# Patient Record
Sex: FEMALE | Race: BLACK OR AFRICAN AMERICAN | NOT HISPANIC OR LATINO | Employment: FULL TIME | URBAN - METROPOLITAN AREA
[De-identification: names, ages, dates, MRNs, and addresses within clinical notes are randomized per-mention and may not be internally consistent; named-entity substitution may affect disease eponyms.]

---

## 2024-07-08 ENCOUNTER — APPOINTMENT (OUTPATIENT)
Dept: PRIMARY CARE | Facility: CLINIC | Age: 29
End: 2024-07-08
Payer: COMMERCIAL

## 2024-07-08 VITALS
TEMPERATURE: 98.4 F | WEIGHT: 245 LBS | DIASTOLIC BLOOD PRESSURE: 72 MMHG | SYSTOLIC BLOOD PRESSURE: 120 MMHG | HEIGHT: 64 IN | HEART RATE: 76 BPM | RESPIRATION RATE: 20 BRPM | BODY MASS INDEX: 41.83 KG/M2

## 2024-07-08 DIAGNOSIS — J30.1 NON-SEASONAL ALLERGIC RHINITIS DUE TO POLLEN: ICD-10-CM

## 2024-07-08 DIAGNOSIS — J45.40 MODERATE PERSISTENT ASTHMA WITHOUT COMPLICATION (HHS-HCC): ICD-10-CM

## 2024-07-08 DIAGNOSIS — N89.8 VAGINAL ODOR: ICD-10-CM

## 2024-07-08 DIAGNOSIS — N89.8 VAGINAL DISCHARGE: Primary | ICD-10-CM

## 2024-07-08 DIAGNOSIS — E66.01 CLASS 3 SEVERE OBESITY DUE TO EXCESS CALORIES WITHOUT SERIOUS COMORBIDITY WITH BODY MASS INDEX (BMI) OF 40.0 TO 44.9 IN ADULT (MULTI): ICD-10-CM

## 2024-07-08 PROBLEM — J02.9 ACUTE PHARYNGITIS: Status: RESOLVED | Noted: 2024-02-13 | Resolved: 2024-07-08

## 2024-07-08 PROBLEM — T83.32XA IUD THREADS LOST: Status: RESOLVED | Noted: 2024-07-08 | Resolved: 2024-07-08

## 2024-07-08 PROBLEM — L20.84 INTRINSIC ECZEMA: Status: ACTIVE | Noted: 2024-07-08

## 2024-07-08 PROBLEM — M25.561 BILATERAL KNEE PAIN: Status: RESOLVED | Noted: 2024-07-08 | Resolved: 2024-07-08

## 2024-07-08 PROBLEM — H10.32 ACUTE CONJUNCTIVITIS OF LEFT EYE: Status: RESOLVED | Noted: 2024-02-13 | Resolved: 2024-07-08

## 2024-07-08 PROBLEM — O21.9 NAUSEA AND VOMITING DURING PREGNANCY (HHS-HCC): Status: RESOLVED | Noted: 2024-07-08 | Resolved: 2024-07-08

## 2024-07-08 PROBLEM — T14.8XXA LIGAMENT TEAR: Status: RESOLVED | Noted: 2024-07-08 | Resolved: 2024-07-08

## 2024-07-08 PROBLEM — M25.562 BILATERAL KNEE PAIN: Status: RESOLVED | Noted: 2024-07-08 | Resolved: 2024-07-08

## 2024-07-08 PROBLEM — E66.813 CLASS 3 SEVERE OBESITY DUE TO EXCESS CALORIES WITHOUT SERIOUS COMORBIDITY WITH BODY MASS INDEX (BMI) OF 40.0 TO 44.9 IN ADULT: Status: ACTIVE | Noted: 2024-07-08

## 2024-07-08 PROCEDURE — 99204 OFFICE O/P NEW MOD 45 MIN: CPT | Performed by: NURSE PRACTITIONER

## 2024-07-08 PROCEDURE — 87205 SMEAR GRAM STAIN: CPT

## 2024-07-08 RX ORDER — CETIRIZINE HYDROCHLORIDE 10 MG/1
10 TABLET ORAL DAILY
Qty: 30 TABLET | Refills: 5 | Status: SHIPPED | OUTPATIENT
Start: 2024-07-08 | End: 2025-01-04

## 2024-07-08 RX ORDER — ALBUTEROL SULFATE 90 UG/1
AEROSOL, METERED RESPIRATORY (INHALATION)
COMMUNITY
Start: 2017-05-12

## 2024-07-08 RX ORDER — TRIAMCINOLONE ACETONIDE 55 UG/1
2 SPRAY, METERED NASAL DAILY
Qty: 16.5 G | Refills: 11 | Status: SHIPPED | OUTPATIENT
Start: 2024-07-08 | End: 2025-07-08

## 2024-07-08 NOTE — ASSESSMENT & PLAN NOTE
States she has cut out sugary drinks and now drinking water. She is working to improve her diet. She eats oatmeal and fruit in the morning. She has stopped eating bread and switched to tortilla wraps. She is working on portion control. She did start going to [employer] and is using weight lifting machines. She can't do vigorous cardio exercise d/t asthma. Advised 150 minutes of moderate intensity (brisk walk) per week and 2-3 days per week of strength training.

## 2024-07-08 NOTE — PROGRESS NOTES
"Subjective   Delmis Dotson is a 28 y.o. female who presents for Obesity, Rash (eczema), and Vaginitis/Bacterial Vaginosis (Has odor and discharge for 4 days. She keeps getting recurring infections. ).  HPI  Review of Systems  Objective   Physical Exam  Vitals reviewed.   Constitutional:       Appearance: Normal appearance.   HENT:      Head: Normocephalic.   Eyes:      Conjunctiva/sclera: Conjunctivae normal.   Cardiovascular:      Rate and Rhythm: Normal rate and regular rhythm.      Pulses: Normal pulses.   Pulmonary:      Breath sounds: Normal breath sounds.   Abdominal:      General: Bowel sounds are normal.      Palpations: Abdomen is soft.   Musculoskeletal:      Cervical back: Neck supple.   Skin:     General: Skin is warm and dry.      Findings: Rash (multiple areas of eczema including above upper lip, across abdomen, inner thighs, and knees.) present.   Neurological:      General: No focal deficit present.      Mental Status: She is alert and oriented to person, place, and time.   Psychiatric:         Mood and Affect: Mood normal.         Thought Content: Thought content normal.     /72   Pulse 76   Temp 36.9 °C (98.4 °F)   Resp 20   Ht 1.626 m (5' 4\")   Wt 111 kg (245 lb)   BMI 42.05 kg/m²   Assessment/Plan   Problem List Items Addressed This Visit       Class 3 severe obesity due to excess calories without serious comorbidity with body mass index (BMI) of 40.0 to 44.9 in adult (Multi)     States she has cut out sugary drinks and now drinking water. She is working to improve her diet. She eats oatmeal and fruit in the morning. She has stopped eating bread and switched to tortilla wraps. She is working on portion control. She did start going to [employer] and is using weight lifting machines. She can't do vigorous cardio exercise d/t asthma. Advised 150 minutes of moderate intensity (brisk walk) per week and 2-3 days per week of strength training.          Moderate persistent asthma " without complication (HHS-HCC)    Relevant Medications    mometasone-formoterol (Dulera 100) 100-5 mcg/actuation inhaler    Non-seasonal allergic rhinitis due to pollen    Relevant Medications    cetirizine (ZyrTEC) 10 mg tablet    triamcinolone (Nasacort) 55 mcg nasal inhaler     Other Visit Diagnoses       Vaginal discharge    -  Primary    Relevant Orders    Vaginitis Gram Stain For Bacterial Vaginosis + Yeast    Vaginal odor        Relevant Orders    Vaginitis Gram Stain For Bacterial Vaginosis + Yeast          Follow-up in 2 months.

## 2024-07-09 ENCOUNTER — TELEPHONE (OUTPATIENT)
Dept: PRIMARY CARE | Facility: CLINIC | Age: 29
End: 2024-07-09
Payer: COMMERCIAL

## 2024-07-09 DIAGNOSIS — L20.84 INTRINSIC ECZEMA: Primary | ICD-10-CM

## 2024-07-09 NOTE — TELEPHONE ENCOUNTER
PT was seen yesterday- needs referral for [facility] in [place].    PT spoke to you about-"[employer] needs 30 day medical cert filled out and sent back by 3:30 today (7/9/24).    Thank you

## 2024-07-10 ENCOUNTER — TELEPHONE (OUTPATIENT)
Dept: PRIMARY CARE | Facility: CLINIC | Age: 29
End: 2024-07-10
Payer: COMMERCIAL

## 2024-07-10 DIAGNOSIS — N76.0 BACTERIAL VAGINOSIS: Primary | ICD-10-CM

## 2024-07-10 DIAGNOSIS — B96.89 BACTERIAL VAGINOSIS: Primary | ICD-10-CM

## 2024-07-10 LAB
CLUE CELLS VAG LPF-#/AREA: PRESENT /[LPF]
NUGENT SCORE: 8
YEAST VAG WET PREP-#/AREA: ABNORMAL

## 2024-07-10 RX ORDER — METRONIDAZOLE 500 MG/1
500 TABLET ORAL 2 TIMES DAILY
Qty: 14 TABLET | Refills: 0 | Status: SHIPPED | OUTPATIENT
Start: 2024-07-10 | End: 2024-07-17

## 2024-07-10 NOTE — TELEPHONE ENCOUNTER
----- Message from Nolvia Mullen sent at 7/10/2024  3:37 PM EDT -----  Please advise patient that she tested for bacterial vaginosis. I have sent a prescription for metronidazole 500 mg oral tablet to be taken twice daily for 7 days. She can not consume alcohol while on that medication because the interaction will make her ill.  Also please advise her that she does not meet the criteria for a 30-day medical certification for [employer] [facility]. Although she has asthma, that is being treated with her inhalers. She does not require oxygen or nebulizer treatments. 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

## 2024-07-10 NOTE — RESULT ENCOUNTER NOTE
Please advise patient that she tested for bacterial vaginosis. I have sent a prescription for metronidazole 500 mg oral tablet to be taken twice daily for 7 days. She can not consume alcohol while on that medication because the interaction will make her ill.  Also please advise her that she does not meet the criteria for a 30-day medical certification for [facility]. Although she has asthma, that is being treated with her inhalers. She does not require oxygen or nebulizer treatments. 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

## 2024-09-09 ENCOUNTER — APPOINTMENT (OUTPATIENT)
Dept: PRIMARY CARE | Facility: CLINIC | Age: 29
End: 2024-09-09
Payer: COMMERCIAL

## 2025-01-25 DIAGNOSIS — J30.1 NON-SEASONAL ALLERGIC RHINITIS DUE TO POLLEN: ICD-10-CM

## 2025-01-27 RX ORDER — CETIRIZINE HYDROCHLORIDE 10 MG/1
10 TABLET ORAL DAILY
Qty: 90 TABLET | Refills: 1 | Status: SHIPPED | OUTPATIENT
Start: 2025-01-27

## 2025-01-27 NOTE — TELEPHONE ENCOUNTER
Pharmacy requests prescription below    Last Office Visit: 7/8/2024   Next Office Visit: Visit date not found     Requested Prescriptions     Pending Prescriptions Disp Refills    cetirizine (ZyrTEC) 10 mg tablet [Pharmacy Med Name: CETIRIZINE HCL 10 MG TABLET] 90 tablet 1     Sig: TAKE 1 TABLET BY MOUTH EVERY DAY

## 2025-02-24 ENCOUNTER — APPOINTMENT (OUTPATIENT)
Dept: PRIMARY CARE | Facility: CLINIC | Age: 30
End: 2025-02-24
Payer: COMMERCIAL

## 2025-02-24 VITALS
DIASTOLIC BLOOD PRESSURE: 87 MMHG | BODY MASS INDEX: 43.19 KG/M2 | WEIGHT: 253 LBS | HEART RATE: 68 BPM | TEMPERATURE: 97.4 F | HEIGHT: 64 IN | SYSTOLIC BLOOD PRESSURE: 132 MMHG | RESPIRATION RATE: 17 BRPM | OXYGEN SATURATION: 96 %

## 2025-02-24 DIAGNOSIS — B96.89 BACTERIAL VAGINOSIS: ICD-10-CM

## 2025-02-24 DIAGNOSIS — Z11.3 ROUTINE SCREENING FOR STI (SEXUALLY TRANSMITTED INFECTION): ICD-10-CM

## 2025-02-24 DIAGNOSIS — E66.01 CLASS 3 SEVERE OBESITY DUE TO EXCESS CALORIES WITHOUT SERIOUS COMORBIDITY WITH BODY MASS INDEX (BMI) OF 40.0 TO 44.9 IN ADULT: Primary | ICD-10-CM

## 2025-02-24 DIAGNOSIS — J45.40 MODERATE PERSISTENT ASTHMA WITHOUT COMPLICATION (HHS-HCC): ICD-10-CM

## 2025-02-24 DIAGNOSIS — N76.0 BACTERIAL VAGINOSIS: ICD-10-CM

## 2025-02-24 DIAGNOSIS — E66.813 CLASS 3 SEVERE OBESITY DUE TO EXCESS CALORIES WITHOUT SERIOUS COMORBIDITY WITH BODY MASS INDEX (BMI) OF 40.0 TO 44.9 IN ADULT: Primary | ICD-10-CM

## 2025-02-24 PROCEDURE — 99214 OFFICE O/P EST MOD 30 MIN: CPT | Performed by: NURSE PRACTITIONER

## 2025-02-24 PROCEDURE — 1036F TOBACCO NON-USER: CPT | Performed by: NURSE PRACTITIONER

## 2025-02-24 PROCEDURE — 3008F BODY MASS INDEX DOCD: CPT | Performed by: NURSE PRACTITIONER

## 2025-02-24 RX ORDER — PHENTERMINE HYDROCHLORIDE 37.5 MG/1
37.5 TABLET ORAL
Qty: 90 TABLET | Refills: 0 | Status: SHIPPED | OUTPATIENT
Start: 2025-02-24 | End: 2025-05-25

## 2025-02-24 NOTE — PROGRESS NOTES
"Subjective   Delmis Dotson is a 29 y.o. female who presents for Weight Management, Vaginitis/Bacterial Vaginosis, and STI Screening.   Patient states she has a new partner and wants to assure she is safe. Reports clear vaginal discharge.   HPI  Review of Systems    Objective     Physical Exam    /87   Pulse 68   Temp 36.3 °C (97.4 °F)   Resp 17   Ht 1.626 m (5' 4\")   Wt 115 kg (253 lb)   SpO2 96%   BMI 43.43 kg/m²     Assessment/Plan     Problem List Items Addressed This Visit       Class 3 severe obesity due to excess calories without serious comorbidity with body mass index (BMI) of 40.0 to 44.9 in adult - Primary    Relevant Medications    phentermine (Adipex-P) 37.5 mg tablet    Other Relevant Orders    Drug Screen, Urine With Reflex to Confirmation    Follow Up In [facility] - PCP - Established    Moderate persistent asthma without complication ([facility])    Relevant Medications    mometasone-formoterol (Dulera 100) 100-5 mcg/actuation inhaler     Other Visit Diagnoses       Routine screening for STI (sexually transmitted infection)        Relevant Orders    Vaginitis Gram Stain For Bacterial Vaginosis + Yeast    HIV 1/2 Antigen/Antibody Screen with Reflex to Confirmation    Hepatitis C antibody    C. trachomatis / N. gonorrhoeae, Amplified, Urogenital    Syphilis Screen with Reflex            "

## 2025-02-25 LAB
C TRACH RRNA SPEC QL NAA+PROBE: NOT DETECTED
N GONORRHOEA RRNA SPEC QL NAA+PROBE: NOT DETECTED
QUEST GC CT AMPLIFIED (ALWAYS MESSAGE): NORMAL

## 2025-02-25 RX ORDER — METRONIDAZOLE 500 MG/1
500 TABLET ORAL 2 TIMES DAILY
Qty: 14 TABLET | Refills: 0 | Status: SHIPPED | OUTPATIENT
Start: 2025-02-25 | End: 2025-03-04

## 2025-02-26 LAB
AMPHETAMINES UR QL: NEGATIVE NG/ML
BARBITURATES UR QL: NEGATIVE NG/ML
BENZODIAZ UR QL: NEGATIVE NG/ML
BV SCORE VAG QL: ABNORMAL
BZE UR QL: NEGATIVE NG/ML
CREAT UR-MCNC: 329.3 MG/DL
DRUG SCREEN COMMENT UR-IMP: ABNORMAL
METHADONE UR QL: NEGATIVE NG/ML
OPIATES UR QL: NEGATIVE NG/ML
OXIDANTS UR QL: NEGATIVE MCG/ML
OXYCODONE UR QL: NEGATIVE NG/ML
PCP UR QL: NEGATIVE NG/ML
PH UR: 5.8 [PH] (ref 4.5–9)
QUEST NOTES AND COMMENTS: ABNORMAL
THC UR QL: POSITIVE NG/ML
THC UR-MCNC: 706 NG/ML

## 2025-03-13 ENCOUNTER — APPOINTMENT (OUTPATIENT)
Facility: CLINIC | Age: 30
End: 2025-03-13
Payer: COMMERCIAL

## 2025-05-27 ENCOUNTER — APPOINTMENT (OUTPATIENT)
Dept: PRIMARY CARE | Facility: CLINIC | Age: 30
End: 2025-05-27
Payer: COMMERCIAL

## 2025-07-31 ENCOUNTER — HOSPITAL ENCOUNTER (EMERGENCY)
Facility: HOSPITAL | Age: 30
Discharge: HOME | End: 2025-07-31
Payer: COMMERCIAL

## 2025-07-31 VITALS
RESPIRATION RATE: 18 BRPM | OXYGEN SATURATION: 99 % | BODY MASS INDEX: 44.83 KG/M2 | SYSTOLIC BLOOD PRESSURE: 135 MMHG | HEART RATE: 72 BPM | HEIGHT: 63 IN | TEMPERATURE: 97 F | DIASTOLIC BLOOD PRESSURE: 63 MMHG | WEIGHT: 253 LBS

## 2025-07-31 DIAGNOSIS — N89.8 VAGINAL ODOR: Primary | ICD-10-CM

## 2025-07-31 LAB
APPEARANCE UR: CLEAR
BILIRUB UR STRIP.AUTO-MCNC: NEGATIVE MG/DL
CLUE CELLS SPEC QL WET PREP: NORMAL
COLOR UR: NORMAL
GLUCOSE UR STRIP.AUTO-MCNC: NORMAL MG/DL
HCG UR QL IA.RAPID: NEGATIVE
KETONES UR STRIP.AUTO-MCNC: NEGATIVE MG/DL
LEUKOCYTE ESTERASE UR QL STRIP.AUTO: NEGATIVE
NITRITE UR QL STRIP.AUTO: NEGATIVE
PH UR STRIP.AUTO: 5.5 [PH]
PROT UR STRIP.AUTO-MCNC: NEGATIVE MG/DL
RBC # UR STRIP.AUTO: NEGATIVE MG/DL
SP GR UR STRIP.AUTO: 1.03
T VAGINALIS SPEC QL WET PREP: NORMAL
TRICHOMONAS REFLEX COMMENT: NORMAL
UROBILINOGEN UR STRIP.AUTO-MCNC: NORMAL MG/DL
WBC VAG QL WET PREP: NORMAL
YEAST VAG QL WET PREP: NORMAL

## 2025-07-31 PROCEDURE — 81025 URINE PREGNANCY TEST: CPT

## 2025-07-31 PROCEDURE — 87661 TRICHOMONAS VAGINALIS AMPLIF: CPT | Mod: STJLAB

## 2025-07-31 PROCEDURE — 87491 CHLMYD TRACH DNA AMP PROBE: CPT | Mod: STJLAB

## 2025-07-31 PROCEDURE — 81003 URINALYSIS AUTO W/O SCOPE: CPT

## 2025-07-31 PROCEDURE — 87210 SMEAR WET MOUNT SALINE/INK: CPT | Mod: 59

## 2025-07-31 PROCEDURE — 99283 EMERGENCY DEPT VISIT LOW MDM: CPT

## 2025-07-31 PROCEDURE — 99284 EMERGENCY DEPT VISIT MOD MDM: CPT

## 2025-07-31 ASSESSMENT — LIFESTYLE VARIABLES
HAVE PEOPLE ANNOYED YOU BY CRITICIZING YOUR DRINKING: NO
TOTAL SCORE: 0
EVER HAD A DRINK FIRST THING IN THE MORNING TO STEADY YOUR NERVES TO GET RID OF A HANGOVER: NO
HAVE YOU EVER FELT YOU SHOULD CUT DOWN ON YOUR DRINKING: NO
EVER FELT BAD OR GUILTY ABOUT YOUR DRINKING: NO

## 2025-07-31 ASSESSMENT — PAIN SCALES - GENERAL
PAINLEVEL_OUTOF10: 0 - NO PAIN
PAINLEVEL_OUTOF10: 0 - NO PAIN

## 2025-07-31 ASSESSMENT — PAIN DESCRIPTION - PAIN TYPE: TYPE: ACUTE PAIN

## 2025-07-31 ASSESSMENT — PAIN - FUNCTIONAL ASSESSMENT: PAIN_FUNCTIONAL_ASSESSMENT: 0-10

## 2025-07-31 NOTE — ED TRIAGE NOTES
Patient recently treated for BV x2 months ago with PO pills states they made her N/V so she was unable to finish tx. Still having foul smelling, white vaginal discharge. Denies any urinary symp. Endorsing wanted to be tested for STD's while she in ED as well.

## 2025-07-31 NOTE — ED PROVIDER NOTES
HPI   Chief Complaint   Patient presents with    Vaginal Discharge     White - treated for BV x2 months ago. Also endorsing foul odor with discharge. Denies painful urination       Patient is a 29-year-old female presenting today for vaginal odor.  Notes that for the last few days, she has noticed the odor.  Denies any vaginal discharge.  Was diagnosed with BV 2 months ago.  States that she missed a few doses of the medication due to being unable to tolerate the taste and throwing it up.  States that the symptoms resolved when she finished the final dose though was worried that it was not fully treated as she noticed the odor.  Does note a new sexual partner a few weeks prior and wants to get STD testing though has lower suspicion for STDs.  Denies any genital sores or ulcers.  Denies any pelvic pain or cramping.  Denies dysuria, hematuria or increased urinary frequency.  She has low suspicion for pregnancy.              Patient History   Medical History[1]  Surgical History[2]  Family History[3]  Social History[4]    Physical Exam   ED Triage Vitals [07/31/25 1253]   Temperature Heart Rate Respirations BP   36.1 °C (97 °F) 80 18 143/67      Pulse Ox Temp Source Heart Rate Source Patient Position   99 % Temporal Monitor Sitting      BP Location FiO2 (%)     Right arm --       Physical Exam  Constitutional:       General: She is not in acute distress.     Appearance: Normal appearance. She is not ill-appearing or toxic-appearing.   HENT:      Head: Normocephalic and atraumatic.   Pulmonary:      Effort: Pulmonary effort is normal.     Musculoskeletal:         General: Normal range of motion.     Skin:     General: Skin is warm and dry.     Neurological:      General: No focal deficit present.      Mental Status: She is alert and oriented to person, place, and time.     Psychiatric:         Mood and Affect: Mood normal.         Behavior: Behavior normal.           ED Course & MDM   ED Course as of 07/31/25 1422   Thu  2025   1411 Wet Prep with Trichomonas Reflex If Neg  Negative BV, yeast, trichomonas [ML]      ED Course User Index  [ML] Lynda Curran PA-C         Diagnoses as of 25 1422   Vaginal odor                 No data recorded     Jaquelin Coma Scale Score: 15 (25 1254 : Ap Kelly, EMT)                           Medical Decision Making  Patient is presenting today vaginal concerning of BV.  Denies any abdominal pain, pelvic pain nausea vomiting fever or chills.  Abdomen soft on exam.  Patient opted for self swab.  In the setting of stable vitals and no pelvic pain, lower suspicion for PID.  Swab sent down for wet prep including BV, trichomonas, yeast.  All negative.  Swabs also obtained for gonorrhea and chlamydia at the time of disposition is still pending.  Will defer empiric treatment at this time.  UA negative for UTI.  Pregnancy test negative.          Procedure  Procedures         [1]   Past Medical History:  Diagnosis Date    18 weeks gestation of pregnancy ([facility]) 2018    18 weeks gestation of pregnancy    29 weeks gestation of pregnancy ([facility]) 2018    29 weeks gestation of pregnancy    31 weeks gestation of pregnancy ([facility]) 2018    31 weeks gestation of pregnancy    34 weeks gestation of pregnancy ([facility]) 2018    34 weeks gestation of pregnancy    36 weeks gestation of pregnancy ([facility]) 10/09/2018    36 weeks gestation of pregnancy    38 weeks gestation of pregnancy ([facility]) 10/23/2018    38 weeks gestation of pregnancy    Acute conjunctivitis of left eye 2024    Acute pharyngitis 2024    Allergic     Anemia complicating pregnancy, unspecified trimester 10/09/2018    Anemia in pregnancy    Asthma     Bilateral knee pain 2024    Chlamydial infection, unspecified 2015    Chlamydia    Eczema     Encounter for  screening for Streptococcus B 10/02/2018     screening for streptococcus B    Encounter for  immunization 2018    Need for Tdap vaccination    Encounter for immunization     Need for Tdap vaccination    Encounter for other preprocedural examination 10/24/2018    Preop testing    Encounter for other specified surgical aftercare 2018    Encounter for post surgical wound check    Encounter for routine checking of intrauterine contraceptive device 2017    Intrauterine device surveillance    Encounter for screening for infections with a predominantly sexual mode of transmission 2018    Screening for STDs (sexually transmitted diseases)    Encounter for supervision of other normal pregnancy, unspecified trimester ([facility]) 10/23/2018    Prenatal care, subsequent pregnancy    IUD threads lost 2024    Ligament tear 2024    Maternal care for unspecified type scar from previous  delivery ([facility]) 10/23/2018    History of  delivery, currently pregnant    Nausea and vomiting during pregnancy 2024    Other conditions influencing health status     Teen pregnancy    Other conditions influencing health status     History of pregnancy    Personal history of other diseases of the digestive system 2018    History of constipation    Personal history of other diseases of the respiratory system 2018    History of asthma    Personal history of other diseases of the respiratory system     Personal history of asthma    Personal history of other drug therapy 2018    History of influenza vaccination    Personal history of other drug therapy     History of influenza vaccination    Personal history of other endocrine, nutritional and metabolic disease 2018    History of obesity    Personal history of other specified conditions 2018    History of weight loss    Uterine size-date discrepancy, third trimester ([facility]) 10/23/2018    Uterine size-date discrepancy in third trimester   [2]   Past Surgical History:  Procedure Laterality Date      SECTION, CLASSIC  01/10/2017     Section   [3] No family history on file.  [4]   Social History  Tobacco Use    Smoking status: Never    Smokeless tobacco: Never   Vaping Use    Vaping status: Never Used   Substance Use Topics    Alcohol use: Not Currently    Drug use: Yes     Types: Marijuana        Lynda Curran PA-C  25 1425

## 2025-07-31 NOTE — DISCHARGE INSTRUCTIONS
BV, yeast, and trichomonas are negative. Gonorrhea and chlamydia swabs will come back in 1-2 days. We will call with any positives and if there are positives, we will start you on treatment. You can also find results on mychart. If all swabs are negative, would recommend using a non scented soap such as dove. If you are douching I would recommend discontinuing this.

## 2025-08-01 LAB
C TRACH RRNA SPEC QL NAA+PROBE: NEGATIVE
HOLD SPECIMEN: NORMAL
N GONORRHOEA DNA SPEC QL PROBE+SIG AMP: NEGATIVE
T VAGINALIS RRNA SPEC QL NAA+PROBE: NEGATIVE